# Patient Record
Sex: MALE
[De-identification: names, ages, dates, MRNs, and addresses within clinical notes are randomized per-mention and may not be internally consistent; named-entity substitution may affect disease eponyms.]

---

## 2018-01-13 ENCOUNTER — HOSPITAL ENCOUNTER (EMERGENCY)
Dept: HOSPITAL 92 - ERS | Age: 43
Discharge: HOME | End: 2018-01-13
Payer: SELF-PAY

## 2018-01-13 DIAGNOSIS — F17.210: ICD-10-CM

## 2018-01-13 DIAGNOSIS — J20.9: Primary | ICD-10-CM

## 2018-01-13 DIAGNOSIS — Z71.6: ICD-10-CM

## 2018-01-13 LAB
ALBUMIN SERPL BCG-MCNC: 4.2 G/DL (ref 3.5–5)
ALP SERPL-CCNC: 76 U/L (ref 40–150)
ALT SERPL W P-5'-P-CCNC: 19 U/L (ref 8–55)
ANION GAP SERPL CALC-SCNC: 13 MMOL/L (ref 10–20)
AST SERPL-CCNC: 18 U/L (ref 5–34)
BASOPHILS # BLD AUTO: 0 THOU/UL (ref 0–0.2)
BASOPHILS NFR BLD AUTO: 0.1 % (ref 0–1)
BILIRUB SERPL-MCNC: 0.5 MG/DL (ref 0.2–1.2)
BUN SERPL-MCNC: 10 MG/DL (ref 8.9–20.6)
CALCIUM SERPL-MCNC: 8.8 MG/DL (ref 7.8–10.44)
CHLORIDE SERPL-SCNC: 103 MMOL/L (ref 98–107)
CO2 SERPL-SCNC: 20 MMOL/L (ref 22–29)
CREAT CL PREDICTED SERPL C-G-VRATE: 0 ML/MIN (ref 70–130)
EOSINOPHIL # BLD AUTO: 0 THOU/UL (ref 0–0.7)
EOSINOPHIL NFR BLD AUTO: 0.6 % (ref 0–10)
GLOBULIN SER CALC-MCNC: 2.8 G/DL (ref 2.4–3.5)
GLUCOSE SERPL-MCNC: 108 MG/DL (ref 70–105)
HGB BLD-MCNC: 14.2 G/DL (ref 14–18)
LYMPHOCYTES # BLD: 0.5 THOU/UL (ref 1.2–3.4)
LYMPHOCYTES NFR BLD AUTO: 6.7 % (ref 21–51)
MCH RBC QN AUTO: 31.1 PG (ref 27–31)
MCV RBC AUTO: 92.5 FL (ref 80–94)
MONOCYTES # BLD AUTO: 0.5 THOU/UL (ref 0.11–0.59)
MONOCYTES NFR BLD AUTO: 6.2 % (ref 0–10)
NEUTROPHILS # BLD AUTO: 6.5 THOU/UL (ref 1.4–6.5)
NEUTROPHILS NFR BLD AUTO: 86.4 % (ref 42–75)
PLATELET # BLD AUTO: 150 THOU/UL (ref 130–400)
POTASSIUM SERPL-SCNC: 3.7 MMOL/L (ref 3.5–5.1)
RBC # BLD AUTO: 4.57 MILL/UL (ref 4.7–6.1)
SODIUM SERPL-SCNC: 132 MMOL/L (ref 136–145)
WBC # BLD AUTO: 7.5 THOU/UL (ref 4.8–10.8)

## 2018-01-13 PROCEDURE — 96374 THER/PROPH/DIAG INJ IV PUSH: CPT

## 2018-01-13 PROCEDURE — 83605 ASSAY OF LACTIC ACID: CPT

## 2018-01-13 PROCEDURE — 87804 INFLUENZA ASSAY W/OPTIC: CPT

## 2018-01-13 PROCEDURE — 36415 COLL VENOUS BLD VENIPUNCTURE: CPT

## 2018-01-13 PROCEDURE — 71046 X-RAY EXAM CHEST 2 VIEWS: CPT

## 2018-01-13 PROCEDURE — A4216 STERILE WATER/SALINE, 10 ML: HCPCS

## 2018-01-13 PROCEDURE — 87040 BLOOD CULTURE FOR BACTERIA: CPT

## 2018-01-13 PROCEDURE — 99406 BEHAV CHNG SMOKING 3-10 MIN: CPT

## 2018-01-13 PROCEDURE — 94640 AIRWAY INHALATION TREATMENT: CPT

## 2018-01-13 PROCEDURE — 80053 COMPREHEN METABOLIC PANEL: CPT

## 2018-01-13 PROCEDURE — 85025 COMPLETE CBC W/AUTO DIFF WBC: CPT

## 2018-01-13 NOTE — RAD
CHEST PA AND LATERAL:

 

Date:  01/13/18

 

HISTORY:  

42-year-old male with dyspnea, weakness, headache, and nonproductive cough. 

 

COMPARISON: 

10/05/15. 

 

FINDINGS:

Heart size is normal. The lungs are clear. 

 

IMPRESSION: 

No acute intrathoracic disease. 

 

 

 

POS: [facility]

## 2018-05-20 ENCOUNTER — HOSPITAL ENCOUNTER (EMERGENCY)
Dept: HOSPITAL 92 - ERS | Age: 43
LOS: 1 days | Discharge: HOME | End: 2018-05-21
Payer: SELF-PAY

## 2018-05-20 DIAGNOSIS — R10.9: Primary | ICD-10-CM

## 2018-05-20 DIAGNOSIS — F17.210: ICD-10-CM

## 2018-05-20 LAB
ALBUMIN SERPL BCG-MCNC: 4.2 G/DL (ref 3.5–5)
ALP SERPL-CCNC: 90 U/L (ref 40–150)
ALT SERPL W P-5'-P-CCNC: 14 U/L (ref 8–55)
ANION GAP SERPL CALC-SCNC: 10 MMOL/L (ref 10–20)
AST SERPL-CCNC: 15 U/L (ref 5–34)
BASOPHILS # BLD AUTO: 0.1 THOU/UL (ref 0–0.2)
BASOPHILS NFR BLD AUTO: 0.9 % (ref 0–1)
BILIRUB SERPL-MCNC: 0.4 MG/DL (ref 0.2–1.2)
BUN SERPL-MCNC: 14 MG/DL (ref 8.9–20.6)
CALCIUM SERPL-MCNC: 9 MG/DL (ref 7.8–10.44)
CHLORIDE SERPL-SCNC: 104 MMOL/L (ref 98–107)
CK MB SERPL-MCNC: 0.7 NG/ML (ref 0–6.6)
CK SERPL-CCNC: 135 U/L (ref 30–200)
CO2 SERPL-SCNC: 27 MMOL/L (ref 22–29)
CREAT CL PREDICTED SERPL C-G-VRATE: 0 ML/MIN (ref 70–130)
EOSINOPHIL # BLD AUTO: 0.4 THOU/UL (ref 0–0.7)
EOSINOPHIL NFR BLD AUTO: 4.7 % (ref 0–10)
GLOBULIN SER CALC-MCNC: 2.8 G/DL (ref 2.4–3.5)
GLUCOSE SERPL-MCNC: 89 MG/DL (ref 70–105)
HGB BLD-MCNC: 14.7 G/DL (ref 14–18)
LIPASE SERPL-CCNC: 37 U/L (ref 8–78)
LYMPHOCYTES # BLD: 2.7 THOU/UL (ref 1.2–3.4)
LYMPHOCYTES NFR BLD AUTO: 30.7 % (ref 21–51)
MAGNESIUM SERPL-MCNC: 2.5 MG/DL (ref 1.6–2.6)
MCH RBC QN AUTO: 30.5 PG (ref 27–31)
MCV RBC AUTO: 92.9 FL (ref 80–94)
MONOCYTES # BLD AUTO: 0.5 THOU/UL (ref 0.11–0.59)
MONOCYTES NFR BLD AUTO: 5.3 % (ref 0–10)
NEUTROPHILS # BLD AUTO: 5.1 THOU/UL (ref 1.4–6.5)
NEUTROPHILS NFR BLD AUTO: 58.3 % (ref 42–75)
PLATELET # BLD AUTO: 194 THOU/UL (ref 130–400)
POTASSIUM SERPL-SCNC: 3.7 MMOL/L (ref 3.5–5.1)
RBC # BLD AUTO: 4.82 MILL/UL (ref 4.7–6.1)
SODIUM SERPL-SCNC: 137 MMOL/L (ref 136–145)
SP GR UR STRIP: 1.03 (ref 1–1.04)
TROPONIN I SERPL DL<=0.01 NG/ML-MCNC: (no result) NG/ML (ref ?–0.03)
WBC # BLD AUTO: 8.7 THOU/UL (ref 4.8–10.8)

## 2018-05-20 PROCEDURE — 80053 COMPREHEN METABOLIC PANEL: CPT

## 2018-05-20 PROCEDURE — 85025 COMPLETE CBC W/AUTO DIFF WBC: CPT

## 2018-05-20 PROCEDURE — 83735 ASSAY OF MAGNESIUM: CPT

## 2018-05-20 PROCEDURE — 84484 ASSAY OF TROPONIN QUANT: CPT

## 2018-05-20 PROCEDURE — 74177 CT ABD & PELVIS W/CONTRAST: CPT

## 2018-05-20 PROCEDURE — 71045 X-RAY EXAM CHEST 1 VIEW: CPT

## 2018-05-20 PROCEDURE — 82550 ASSAY OF CK (CPK): CPT

## 2018-05-20 PROCEDURE — 83690 ASSAY OF LIPASE: CPT

## 2018-05-20 PROCEDURE — 81003 URINALYSIS AUTO W/O SCOPE: CPT

## 2018-05-20 PROCEDURE — 82553 CREATINE MB FRACTION: CPT

## 2018-05-20 PROCEDURE — 36415 COLL VENOUS BLD VENIPUNCTURE: CPT

## 2018-05-20 PROCEDURE — 93005 ELECTROCARDIOGRAM TRACING: CPT

## 2018-05-20 NOTE — CT
CONTRAST ENHANCED CT OF THE ABDOMEN AND PELVIS:

 

History: 42-year-old presents with constant stabbing pain for the past several months. The patient pr
esents to Emergency Department for emergent work up of this chronic abdominal pain. IV contrast given
. Oral contrast, unfortunately, as not given which decreases the sensitivity for detection of patholo
gy. 

 

FINDINGS: 

The lung bases are unremarkable. 

 

No evidence of free intraperitoneal air is seen. 

 

The liver and spleen are unremarkable. The gallbladder is unremarkable. The pancreas is unremarkable.
 

 

The stomach is moderately distended. The small bowel demonstrates normal appearance without evidence 
of obstruction. 

 

The appendix is difficult to visualize and is definitely not seen. 

 

No evidence of periaortic lymphadenopathy is seen. 

 

The patient has had previous lower lumbar and upper sacral fusion. 

 

No evidence of periaortic lymphadenopathy seen. No evidence of renal calculi seen. 

 

An area of a small sclerotic change is seen in the left ischium, possibly representing a bone island.
 

 

No other obvious bony lesions seen. 

 

Incidentally noted retroaortic left renal vein is present. 

 

 

IMPRESSION: 

1. Post-surgical changes, otherwise unremarkable CT images of the abdomen and pelvis. 

 

POS: [facility]

## 2018-05-20 NOTE — RAD
AP CHEST:

 

History: Chest pain, abdominal pain. 

 

FINDINGS: 

The lungs are well aerated. No evidence of active intrathoracic disease is seen. No evidence of effus
ions, pneumonia, or pneumothorax is seen. 

 

IMPRESSION: 

Unremarkable AP chest. 

 

POS: [facility]

## 2020-03-19 ENCOUNTER — HOSPITAL ENCOUNTER (EMERGENCY)
Dept: HOSPITAL 57 - BURERS | Age: 45
Discharge: HOME | End: 2020-03-19
Payer: SELF-PAY

## 2020-03-19 DIAGNOSIS — R10.32: ICD-10-CM

## 2020-03-19 DIAGNOSIS — R11.2: ICD-10-CM

## 2020-03-19 DIAGNOSIS — Z87.891: ICD-10-CM

## 2020-03-19 DIAGNOSIS — G89.29: Primary | ICD-10-CM

## 2020-03-19 LAB
ALBUMIN SERPL BCG-MCNC: 4.5 G/DL (ref 3.5–5)
ALP SERPL-CCNC: 102 U/L (ref 40–110)
ALT SERPL W P-5'-P-CCNC: 23 U/L (ref 8–55)
ANION GAP SERPL CALC-SCNC: 15 MMOL/L (ref 10–20)
AST SERPL-CCNC: 22 U/L (ref 5–34)
BASOPHILS # BLD AUTO: 0.1 THOU/UL (ref 0–0.2)
BASOPHILS NFR BLD AUTO: 1.1 % (ref 0–1)
BILIRUB SERPL-MCNC: 0.7 MG/DL (ref 0.2–1.2)
BUN SERPL-MCNC: 12 MG/DL (ref 8.9–20.6)
CALCIUM SERPL-MCNC: 9.1 MG/DL (ref 7.8–10.44)
CHLORIDE SERPL-SCNC: 103 MMOL/L (ref 98–107)
CO2 SERPL-SCNC: 24 MMOL/L (ref 22–29)
CREAT CL PREDICTED SERPL C-G-VRATE: 0 ML/MIN (ref 70–130)
EOSINOPHIL # BLD AUTO: 0.3 THOU/UL (ref 0–0.7)
EOSINOPHIL NFR BLD AUTO: 4.3 % (ref 0–10)
GLOBULIN SER CALC-MCNC: 3.2 G/DL (ref 2.4–3.5)
GLUCOSE SERPL-MCNC: 115 MG/DL (ref 70–105)
HGB BLD-MCNC: 15 G/DL (ref 14–18)
LIPASE SERPL-CCNC: 16 U/L (ref 8–78)
LYMPHOCYTES # BLD AUTO: 2.1 THOU/UL (ref 1.2–3.4)
LYMPHOCYTES NFR BLD AUTO: 26.9 % (ref 21–51)
MCH RBC QN AUTO: 28.9 PG (ref 27–31)
MCV RBC AUTO: 90.7 FL (ref 78–98)
MONOCYTES # BLD AUTO: 0.6 THOU/UL (ref 0.11–0.59)
MONOCYTES NFR BLD AUTO: 7.6 % (ref 0–10)
NEUTROPHILS # BLD AUTO: 4.7 THOU/UL (ref 1.4–6.5)
NEUTROPHILS NFR BLD AUTO: 60.2 % (ref 42–75)
PLATELET # BLD AUTO: 217 THOU/UL (ref 130–400)
POTASSIUM SERPL-SCNC: 4 MMOL/L (ref 3.5–5.1)
RBC # BLD AUTO: 5.2 MILL/UL (ref 4.7–6.1)
SODIUM SERPL-SCNC: 138 MMOL/L (ref 136–145)
WBC # BLD AUTO: 7.8 THOU/UL (ref 4.8–10.8)

## 2020-03-19 PROCEDURE — 83690 ASSAY OF LIPASE: CPT

## 2020-03-19 PROCEDURE — 84484 ASSAY OF TROPONIN QUANT: CPT

## 2020-03-19 PROCEDURE — 36415 COLL VENOUS BLD VENIPUNCTURE: CPT

## 2020-03-19 PROCEDURE — 80053 COMPREHEN METABOLIC PANEL: CPT

## 2020-03-19 PROCEDURE — 93005 ELECTROCARDIOGRAM TRACING: CPT

## 2020-03-19 PROCEDURE — 85025 COMPLETE CBC W/AUTO DIFF WBC: CPT

## 2020-09-21 ENCOUNTER — HOSPITAL ENCOUNTER (OUTPATIENT)
Dept: HOSPITAL 92 - ERS | Age: 45
Setting detail: OBSERVATION
LOS: 1 days | Discharge: HOME | End: 2020-09-22
Attending: INTERNAL MEDICINE | Admitting: INTERNAL MEDICINE
Payer: COMMERCIAL

## 2020-09-21 VITALS — BODY MASS INDEX: 25.7 KG/M2

## 2020-09-21 DIAGNOSIS — J12.89: ICD-10-CM

## 2020-09-21 DIAGNOSIS — F17.210: ICD-10-CM

## 2020-09-21 DIAGNOSIS — Z79.02: ICD-10-CM

## 2020-09-21 DIAGNOSIS — Z79.899: ICD-10-CM

## 2020-09-21 DIAGNOSIS — R07.89: Primary | ICD-10-CM

## 2020-09-21 DIAGNOSIS — U07.1: ICD-10-CM

## 2020-09-21 DIAGNOSIS — K21.9: ICD-10-CM

## 2020-09-21 DIAGNOSIS — Z79.82: ICD-10-CM

## 2020-09-21 DIAGNOSIS — Z95.5: ICD-10-CM

## 2020-09-21 LAB
ALBUMIN SERPL BCG-MCNC: 3.8 G/DL (ref 3.5–5)
ALP SERPL-CCNC: 92 U/L (ref 40–110)
ALT SERPL W P-5'-P-CCNC: 31 U/L (ref 8–55)
ANION GAP SERPL CALC-SCNC: 12 MMOL/L (ref 10–20)
AST SERPL-CCNC: 27 U/L (ref 5–34)
BILIRUB SERPL-MCNC: 0.3 MG/DL (ref 0.2–1.2)
BUN SERPL-MCNC: 18 MG/DL (ref 8.9–20.6)
CALCIUM SERPL-MCNC: 8.3 MG/DL (ref 7.8–10.44)
CHLORIDE SERPL-SCNC: 106 MMOL/L (ref 98–107)
CO2 SERPL-SCNC: 20 MMOL/L (ref 22–29)
CREAT CL PREDICTED SERPL C-G-VRATE: 0 ML/MIN (ref 70–130)
GLOBULIN SER CALC-MCNC: 2.5 G/DL (ref 2.4–3.5)
GLUCOSE SERPL-MCNC: 90 MG/DL (ref 70–105)
HGB BLD-MCNC: 12.7 G/DL (ref 14–18)
MCH RBC QN AUTO: 30.9 PG (ref 27–31)
MCV RBC AUTO: 93.5 FL (ref 78–98)
MDIFF COMPLETE?: YES
PLATELET # BLD AUTO: 176 THOU/UL (ref 130–400)
POTASSIUM SERPL-SCNC: 4.1 MMOL/L (ref 3.5–5.1)
RBC # BLD AUTO: 4.11 MILL/UL (ref 4.7–6.1)
SARS-COV-2 RNA RESP QL NAA+PROBE: DETECTED
SODIUM SERPL-SCNC: 134 MMOL/L (ref 136–145)
TROPONIN I SERPL DL<=0.01 NG/ML-MCNC: 0.01 NG/ML (ref ?–0.03)
WBC # BLD AUTO: 4.8 THOU/UL (ref 4.8–10.8)

## 2020-09-21 PROCEDURE — 87635 SARS-COV-2 COVID-19 AMP PRB: CPT

## 2020-09-21 PROCEDURE — 93005 ELECTROCARDIOGRAM TRACING: CPT

## 2020-09-21 PROCEDURE — 80053 COMPREHEN METABOLIC PANEL: CPT

## 2020-09-21 PROCEDURE — U0003 INFECTIOUS AGENT DETECTION BY NUCLEIC ACID (DNA OR RNA); SEVERE ACUTE RESPIRATORY SYNDROME CORONAVIRUS 2 (SARS-COV-2) (CORONAVIRUS DISEASE [COVID-19]), AMPLIFIED PROBE TECHNIQUE, MAKING USE OF HIGH THROUGHPUT TECHNOLOGIES AS DESCRIBED BY CMS-2020-01-R: HCPCS

## 2020-09-21 PROCEDURE — 84484 ASSAY OF TROPONIN QUANT: CPT

## 2020-09-21 PROCEDURE — 93010 ELECTROCARDIOGRAM REPORT: CPT

## 2020-09-21 PROCEDURE — 96374 THER/PROPH/DIAG INJ IV PUSH: CPT

## 2020-09-21 PROCEDURE — 96376 TX/PRO/DX INJ SAME DRUG ADON: CPT

## 2020-09-21 PROCEDURE — G0378 HOSPITAL OBSERVATION PER HR: HCPCS

## 2020-09-21 PROCEDURE — 83880 ASSAY OF NATRIURETIC PEPTIDE: CPT

## 2020-09-21 PROCEDURE — 85025 COMPLETE CBC W/AUTO DIFF WBC: CPT

## 2020-09-21 PROCEDURE — 36415 COLL VENOUS BLD VENIPUNCTURE: CPT

## 2020-09-21 PROCEDURE — 71045 X-RAY EXAM CHEST 1 VIEW: CPT

## 2020-09-21 PROCEDURE — 96375 TX/PRO/DX INJ NEW DRUG ADDON: CPT

## 2020-09-21 PROCEDURE — 76936 ECHO GUIDE FOR ARTERY REPAIR: CPT

## 2020-09-21 RX ADMIN — Medication SCH ML: at 21:04

## 2020-09-21 RX ADMIN — TICAGRELOR SCH: 90 TABLET ORAL at 07:59

## 2020-09-21 RX ADMIN — TICAGRELOR SCH MG: 90 TABLET ORAL at 09:42

## 2020-09-21 RX ADMIN — Medication SCH ML: at 08:17

## 2020-09-21 RX ADMIN — TICAGRELOR SCH MG: 90 TABLET ORAL at 21:02

## 2020-09-21 RX ADMIN — ASPIRIN SCH: 81 TABLET ORAL at 07:58

## 2020-09-21 NOTE — RAD
Exam: Chest one view



HISTORY:Left-sided chest pain.



Comparison: 9/11/2020



FINDINGS:

Cardiac silhouette: Normal

Aorta: Unremarkable

Pulmonary vessels: Normal

Costophrenic angles: Clear



LUNGS: No masses or consolidation.



Pneumothorax: None



Osseous abnormalities: None



IMPRESSION: No acute cardiopulmonary process.



Reported By: Rupinder Garcia 

Electronically Signed:  9/21/2020 7:20 AM

## 2020-09-21 NOTE — ULT
US PseudoAneurysm Cody Evl



History: Evaluate for pseudoaneurysm. Recent catheterization.



Comparison: None.



Findings: Real-time grayscale color and spectral analysis of the right groin was performed.



No pseudoaneurysm is appreciated. Mild soft tissue swelling.



Impression: No pseudoaneurysm.



Reported By: Alex Last 

Electronically Signed:  9/21/2020 11:29 AM

## 2020-09-21 NOTE — PRG
DATE OF SERVICE:  09/21/2020



SUBJECTIVE:  Mr. Neff came back to the emergency room.  He has sharp stabbing

chest pain.  He can localize with one finger.  It was reproduced with palpation of

his ribs, but did not get worse with a breath. 



He came back to the emergency room.  Cardiac enzymes were negative.  EKG. 

Normal.



OBJECTIVE:  VITAL SIGNS:  Blood pressure 105/60. 

LUNGS:  Clear. 

CARDIAC:  Normal S1, normal S2.   

ABDOMEN:  Soft, nontender EXTREMITIES:  No edema.  In the right groin, there is a

small __________ at the cath site. 



ASSESSMENT:  

1. Chest pain, sounds more musculoskeletal.

2. Recent myocardial infarction.



PLAN:  Give him a dose of Toradol. Repeat echocardiogram. 

We will do ultrasound of the groin since he is here, reevaluate and need to get the

patient's pain under control prior to discharge, otherwise, I think he will come

back to the hospital.  I did review the cath films, stented artery had an excellent

result __________ stent across a very small branch of the LAD.  It looks like about

a 60% narrowing, but the lesion is just after an extremely large septal perforating

artery.  This supplies most of his septum, it is suboptimal for stenting.  The

patient's pain at this time sounds noncardiac.  We will continue to follow with you. 







Job ID:  [id number]

## 2020-09-21 NOTE — HP
REASON FOR ADMISSION:  Chest pain.



HISTORY OF PRESENT ILLNESS:  This is a 45-year-old male patient who presented to the

ER complaining of chest pain described as stabbing like in nature, localized in the

left parasternal area off and on, occurring at rest, but influenced by positioning

himself on the left side, which increases his pain.  He denies shortness of breath.

The intensity of the pain can range from mild to severe and it reminds him of his

the pain that he had before he got a stent placed 10 days ago. 



Reviewing his records, the patient was admitted to our hospital approximately 10

days ago, diagnosed with non-ST elevation MI.  He underwent cardiac catheterization

by Cardiology, which showed 95% obstruction of circumflex and 60% of the LAD.  He

underwent stent placement to the circumflex and was started on aspirin, Brilinta, an

ACE inhibitor, and statin.  The patient was discharged home and he has been

compliant with his medications. 



In the ER, he did complain of left lower quadrant pain and generalized abdominal

pain that has been ongoing, I did review his records and I see that he had an EGD

done and he was positive for H pylori.  It was documented that he is being treated

for that. 



PAST MEDICAL HISTORY:  

1. Non-ST elevation MI.

2. GERD.



SOCIAL HISTORY:  He is an active smoker.  Does not drink alcohol.  Does not abuse

drugs. 



ALLERGIES:  NOT KNOWN TO HAVE ANY DRUG ALLERGIES.



FAMILY HISTORY:  Brother had heart attack at age 47, his father at age 65.



REVIEW OF SYSTEMS:  All systems reviewed except the above mentioned, found to be

negative. 



PHYSICAL EXAMINATION:

GENERAL:  Awake, alert, oriented, does not appear in distress. 

VITAL SIGNS:  His blood pressure is 100/51, pulse 67, temperature is 97.8, and

saturating 90% on room air. 

HEENT:  Head is nontraumatic and normocephalic.  Pupils are equal and reactive.

Extraocular movements are intact.  Nonicteric sclerae.  Well injected conjunctivae.

Oral mucosa normal.  Nasal mucosa normal. 

NECK:  Supple.  No adenopathy.  No murmur.  Thyroid is not palpable.  Trachea is

midline.  No supraclavicular adenopathy. 

CARDIOVASCULAR:  S1, S2 regular.  No murmur.  No gallops.  No friction rubs.  No

displacement of PMI.  I am able to reproduce the pain that he is complaining of with

palpation of his left parasternal area. 

ABDOMEN:  Bowel sounds are positive.  Abdomen is slightly distended.  It is tender

to touch, but overall soft. 

EXTREMITIES:  No lower extremity edema.  No cyanosis. 

NEUROLOGIC:  Cranial nerves 2 through 12 within normal limits.  Normal motor

function.  Normal sensory function.  Normal reflexes. 



LABORATORY DATA:  Blood work shows a WBC of 4.8, hemoglobin 12.7, platelets 176.

Sodium 134, potassium 4.1, bicarb of 20, creatinine 1.06.  Troponin 0.01, repeat

0.013.  EKG shows normal sinus rhythm, no ST-segment or T-wave changes. 



ASSESSMENT AND PLAN:  This is a 45-year-old male patient presenting with chest pain.

 He had a stent placed approximately 10 days ago.  So far, his troponins are

negative.  His pain is somewhat reproducible with palpation.  The patient will be

admitted to telemetry.  We will continue cycling his cardiac enzymes.  I will

consult Cardiology and resume his home medications when the med [facility] is available. 



In regard of his abdominal pain, it seems like it has been a chronic issue.  He is

on the H pylori treatment regimen and Protonix, we will resume those medications. 



For deep venous thrombosis prophylaxis, he will be on SCDs.







Job ID:  [id number]

## 2020-09-22 VITALS — TEMPERATURE: 98.9 F | DIASTOLIC BLOOD PRESSURE: 68 MMHG | SYSTOLIC BLOOD PRESSURE: 120 MMHG

## 2020-09-22 RX ADMIN — TICAGRELOR SCH MG: 90 TABLET ORAL at 09:51

## 2020-09-22 RX ADMIN — ASPIRIN SCH MG: 81 TABLET ORAL at 09:50

## 2020-09-22 RX ADMIN — Medication SCH ML: at 09:51

## 2020-09-23 NOTE — DIS
DATE OF ADMISSION:  09/21/2020



DATE OF DISCHARGE:  09/22/2020



DISCHARGE DIAGNOSES:  

1. Atypical chest pain.

2. COVID pneumonia.



HOSPITAL COURSE:  The patient is a 45-year-old male who initially presented to the

hospital on 09/21, with complaints of chest pain.  The patient had just got stent

about 10 days ago.  He was seen by Cardiology who thought that this pain was most

likely secondary to a noncardiac related.  He did have an ultrasound of his groin

area to rule out pseudoaneurysm.  There was no pseudoaneurysm that was noted.  The

patient continued to have negative troponins.  He, however, was tested positive for

COVID.  He was completely asymptomatic.  He was discharged home.  He will follow up

with his primary.  His home medications were resumed.  He is going to be on: 

1. Brilinta 90 mg b.i.d.

2. Aspirin 81 mg daily.

3. Atorvastatin 40 mg at bedtime.

4. Lisinopril 5 mg daily.

5. Omeprazole 40 mg daily.



DISCHARGE PHYSICAL EXAMINATION:  VITAL SIGNS:  Temperature of 98.9, pulse 74, O2 sat

97% on room air, blood pressure __________. 

GENERAL:  He is awake, alert, and oriented x3.  Does not appear in distress. 

CV:  S1, S2 present.  No murmurs, rubs, gallops. 



Again, he will be discharged home.  He was asked to come back if any of his symptoms

changed. 







Job ID:  [id number]

## 2020-09-27 NOTE — EKG
Test Reason : C/O CHEST PAIN

Blood Pressure : ***/*** mmHG

Vent. Rate : 058 BPM     Atrial Rate : 058 BPM

   P-R Int : 176 ms          QRS Dur : 092 ms

    QT Int : 398 ms       P-R-T Axes : 052 042 036 degrees

   QTc Int : 390 ms

 

Sinus bradycardia

Otherwise normal ECG

When compared with ECG of 21-SEP-2020 00:48, (Unconfirmed)

No significant change was found

Confirmed by JULIA ROBERTS, JANE ([id number]) on 9/27/2020 10:41:23 AM

 

Referred By:  WHITNEY           Confirmed By:JANE DUMONT MD

## 2020-09-30 ENCOUNTER — HOSPITAL ENCOUNTER (EMERGENCY)
Dept: HOSPITAL 92 - ERS | Age: 45
Discharge: HOME | End: 2020-09-30
Payer: SELF-PAY

## 2020-09-30 DIAGNOSIS — Z79.899: ICD-10-CM

## 2020-09-30 DIAGNOSIS — I25.2: ICD-10-CM

## 2020-09-30 DIAGNOSIS — Z87.891: ICD-10-CM

## 2020-09-30 DIAGNOSIS — R07.89: Primary | ICD-10-CM

## 2020-09-30 DIAGNOSIS — Z79.82: ICD-10-CM

## 2020-09-30 LAB
ALBUMIN SERPL BCG-MCNC: 4.2 G/DL (ref 3.5–5)
ALP SERPL-CCNC: 99 U/L (ref 40–110)
ALT SERPL W P-5'-P-CCNC: 53 U/L (ref 8–55)
ANION GAP SERPL CALC-SCNC: 12 MMOL/L (ref 10–20)
AST SERPL-CCNC: 27 U/L (ref 5–34)
BASOPHILS # BLD AUTO: 0.1 THOU/UL (ref 0–0.2)
BASOPHILS NFR BLD AUTO: 1 % (ref 0–1)
BILIRUB SERPL-MCNC: 0.4 MG/DL (ref 0.2–1.2)
BUN SERPL-MCNC: 12 MG/DL (ref 8.9–20.6)
CALCIUM SERPL-MCNC: 8.8 MG/DL (ref 7.8–10.44)
CHLORIDE SERPL-SCNC: 106 MMOL/L (ref 98–107)
CO2 SERPL-SCNC: 26 MMOL/L (ref 22–29)
CREAT CL PREDICTED SERPL C-G-VRATE: 0 ML/MIN (ref 70–130)
EOSINOPHIL # BLD AUTO: 0.6 THOU/UL (ref 0–0.7)
EOSINOPHIL NFR BLD AUTO: 7.4 % (ref 0–10)
GLOBULIN SER CALC-MCNC: 2.8 G/DL (ref 2.4–3.5)
GLUCOSE SERPL-MCNC: 95 MG/DL (ref 70–105)
HGB BLD-MCNC: 13.7 G/DL (ref 14–18)
LYMPHOCYTES # BLD: 2 THOU/UL (ref 1.2–3.4)
LYMPHOCYTES NFR BLD AUTO: 24.4 % (ref 21–51)
MCH RBC QN AUTO: 30.2 PG (ref 27–31)
MCV RBC AUTO: 92.1 FL (ref 78–98)
MONOCYTES # BLD AUTO: 0.7 THOU/UL (ref 0.11–0.59)
MONOCYTES NFR BLD AUTO: 8.4 % (ref 0–10)
NEUTROPHILS # BLD AUTO: 4.9 THOU/UL (ref 1.4–6.5)
NEUTROPHILS NFR BLD AUTO: 58.7 % (ref 42–75)
PLATELET # BLD AUTO: 209 THOU/UL (ref 130–400)
POTASSIUM SERPL-SCNC: 3.9 MMOL/L (ref 3.5–5.1)
RBC # BLD AUTO: 4.53 MILL/UL (ref 4.7–6.1)
SODIUM SERPL-SCNC: 140 MMOL/L (ref 136–145)
WBC # BLD AUTO: 8.3 THOU/UL (ref 4.8–10.8)

## 2020-09-30 PROCEDURE — 93005 ELECTROCARDIOGRAM TRACING: CPT

## 2020-09-30 PROCEDURE — 71045 X-RAY EXAM CHEST 1 VIEW: CPT

## 2020-09-30 PROCEDURE — 36415 COLL VENOUS BLD VENIPUNCTURE: CPT

## 2020-09-30 PROCEDURE — 85025 COMPLETE CBC W/AUTO DIFF WBC: CPT

## 2020-09-30 PROCEDURE — 84484 ASSAY OF TROPONIN QUANT: CPT

## 2020-09-30 PROCEDURE — 80053 COMPREHEN METABOLIC PANEL: CPT

## 2020-09-30 NOTE — RAD
XR Chest 1 View Portable



HISTORY: Chest pain



COMPARISON: 9/21/2020



FINDINGS: The heart size is normal. The lungs are well expanded without focal areas of consolidation,
 pneumothorax or pleural effusions.



IMPRESSION: No radiographic evidence of acute cardiopulmonary process.



Reported By: José Miguel Prakash 

Electronically Signed:  9/30/2020 9:55 PM

## 2020-10-05 ENCOUNTER — HOSPITAL ENCOUNTER (EMERGENCY)
Dept: HOSPITAL 92 - ERS | Age: 45
LOS: 1 days | Discharge: HOME | End: 2020-10-06
Payer: SELF-PAY

## 2020-10-05 DIAGNOSIS — J18.9: Primary | ICD-10-CM

## 2020-10-05 DIAGNOSIS — Z87.891: ICD-10-CM

## 2020-10-05 DIAGNOSIS — Z79.899: ICD-10-CM

## 2020-10-05 DIAGNOSIS — Z79.82: ICD-10-CM

## 2020-10-05 LAB
BASOPHILS # BLD AUTO: 0.1 THOU/UL (ref 0–0.2)
BASOPHILS NFR BLD AUTO: 1.1 % (ref 0–1)
EOSINOPHIL # BLD AUTO: 0.5 THOU/UL (ref 0–0.7)
EOSINOPHIL NFR BLD AUTO: 7.5 % (ref 0–10)
HGB BLD-MCNC: 12.9 G/DL (ref 14–18)
LYMPHOCYTES # BLD: 1.9 THOU/UL (ref 1.2–3.4)
LYMPHOCYTES NFR BLD AUTO: 29 % (ref 21–51)
MCH RBC QN AUTO: 30.2 PG (ref 27–31)
MCV RBC AUTO: 92.2 FL (ref 78–98)
MONOCYTES # BLD AUTO: 0.6 THOU/UL (ref 0.11–0.59)
MONOCYTES NFR BLD AUTO: 8.8 % (ref 0–10)
NEUTROPHILS # BLD AUTO: 3.5 THOU/UL (ref 1.4–6.5)
NEUTROPHILS NFR BLD AUTO: 53.6 % (ref 42–75)
PLATELET # BLD AUTO: 201 THOU/UL (ref 130–400)
RBC # BLD AUTO: 4.26 MILL/UL (ref 4.7–6.1)
WBC # BLD AUTO: 6.6 THOU/UL (ref 4.8–10.8)

## 2020-10-05 PROCEDURE — 84484 ASSAY OF TROPONIN QUANT: CPT

## 2020-10-05 PROCEDURE — 71045 X-RAY EXAM CHEST 1 VIEW: CPT

## 2020-10-05 PROCEDURE — 83880 ASSAY OF NATRIURETIC PEPTIDE: CPT

## 2020-10-05 PROCEDURE — 80306 DRUG TEST PRSMV INSTRMNT: CPT

## 2020-10-05 PROCEDURE — 85025 COMPLETE CBC W/AUTO DIFF WBC: CPT

## 2020-10-05 PROCEDURE — 93005 ELECTROCARDIOGRAM TRACING: CPT

## 2020-10-05 PROCEDURE — 36415 COLL VENOUS BLD VENIPUNCTURE: CPT

## 2020-10-05 PROCEDURE — 80053 COMPREHEN METABOLIC PANEL: CPT

## 2020-10-05 NOTE — RAD
Exam: Chest one view



HISTORY:Chest pain



Comparison: 9/30/2020



FINDINGS:

Cardiac silhouette: Normal

Aorta: Unremarkable

Pulmonary vessels: Normal

Costophrenic angles: Clear



LUNGS: Patchy interstitial opacities in the lung bases with questionable focal alveolar infiltrate in
 the right lower lobe.

Pneumothorax: None



Osseous abnormalities: None



IMPRESSION: Bibasilar interstitial infiltrate with possible superimposed alveolar infiltrate in the r
ight lower lobe.



Reported By: Rupinder Garcia 

Electronically Signed:  10/5/2020 11:41 PM

## 2020-10-06 ENCOUNTER — HOSPITAL ENCOUNTER (EMERGENCY)
Dept: HOSPITAL 57 - BURERS | Age: 45
LOS: 1 days | Discharge: HOME | End: 2020-10-07
Payer: SELF-PAY

## 2020-10-06 DIAGNOSIS — F17.210: ICD-10-CM

## 2020-10-06 DIAGNOSIS — I10: ICD-10-CM

## 2020-10-06 DIAGNOSIS — Z79.899: ICD-10-CM

## 2020-10-06 DIAGNOSIS — I25.2: ICD-10-CM

## 2020-10-06 DIAGNOSIS — R07.89: Primary | ICD-10-CM

## 2020-10-06 DIAGNOSIS — E78.00: ICD-10-CM

## 2020-10-06 DIAGNOSIS — Z79.82: ICD-10-CM

## 2020-10-06 LAB
ALBUMIN SERPL BCG-MCNC: 3.7 G/DL (ref 3.5–5)
ALBUMIN SERPL BCG-MCNC: 3.8 G/DL (ref 3.5–5)
ALP SERPL-CCNC: 83 U/L (ref 40–110)
ALP SERPL-CCNC: 90 U/L (ref 40–110)
ALT SERPL W P-5'-P-CCNC: 28 U/L (ref 8–55)
ALT SERPL W P-5'-P-CCNC: 34 U/L (ref 8–55)
ANION GAP SERPL CALC-SCNC: 12 MMOL/L (ref 10–20)
ANION GAP SERPL CALC-SCNC: 14 MMOL/L (ref 10–20)
AST SERPL-CCNC: 20 U/L (ref 5–34)
AST SERPL-CCNC: 24 U/L (ref 5–34)
BASOPHILS # BLD AUTO: 0.1 THOU/UL (ref 0–0.2)
BASOPHILS NFR BLD AUTO: 1.6 % (ref 0–1)
BILIRUB SERPL-MCNC: 0.5 MG/DL (ref 0.2–1.2)
BILIRUB SERPL-MCNC: 0.6 MG/DL (ref 0.2–1.2)
BUN SERPL-MCNC: 19 MG/DL (ref 8.9–20.6)
BUN SERPL-MCNC: 19 MG/DL (ref 8.9–20.6)
CALCIUM SERPL-MCNC: 8.4 MG/DL (ref 7.8–10.44)
CALCIUM SERPL-MCNC: 8.5 MG/DL (ref 7.8–10.44)
CHLORIDE SERPL-SCNC: 106 MMOL/L (ref 98–107)
CHLORIDE SERPL-SCNC: 106 MMOL/L (ref 98–107)
CO2 SERPL-SCNC: 21 MMOL/L (ref 22–29)
CO2 SERPL-SCNC: 26 MMOL/L (ref 22–29)
CREAT CL PREDICTED SERPL C-G-VRATE: 0 ML/MIN (ref 70–130)
CREAT CL PREDICTED SERPL C-G-VRATE: 0 ML/MIN (ref 70–130)
DRUG SCREEN CUTOFF: (no result)
EOSINOPHIL # BLD AUTO: 0.5 THOU/UL (ref 0–0.7)
EOSINOPHIL NFR BLD AUTO: 8.2 % (ref 0–10)
GLOBULIN SER CALC-MCNC: 2.6 G/DL (ref 2.4–3.5)
GLOBULIN SER CALC-MCNC: 2.8 G/DL (ref 2.4–3.5)
GLUCOSE SERPL-MCNC: 85 MG/DL (ref 70–105)
GLUCOSE SERPL-MCNC: 94 MG/DL (ref 70–105)
HGB BLD-MCNC: 12.6 G/DL (ref 14–18)
LYMPHOCYTES # BLD AUTO: 1.7 THOU/UL (ref 1.2–3.4)
LYMPHOCYTES NFR BLD AUTO: 26 % (ref 21–51)
MCH RBC QN AUTO: 29.2 PG (ref 27–31)
MCV RBC AUTO: 93.6 FL (ref 78–98)
MEDTOX CONTROL LINE VALID?: (no result)
MEDTOX READER #: (no result)
MONOCYTES # BLD AUTO: 0.6 THOU/UL (ref 0.11–0.59)
MONOCYTES NFR BLD AUTO: 8.9 % (ref 0–10)
NEUTROPHILS # BLD AUTO: 3.7 THOU/UL (ref 1.4–6.5)
NEUTROPHILS NFR BLD AUTO: 55.3 % (ref 42–75)
PLATELET # BLD AUTO: 172 THOU/UL (ref 130–400)
POTASSIUM SERPL-SCNC: 3.8 MMOL/L (ref 3.5–5.1)
POTASSIUM SERPL-SCNC: 3.9 MMOL/L (ref 3.5–5.1)
RBC # BLD AUTO: 4.33 MILL/UL (ref 4.7–6.1)
SODIUM SERPL-SCNC: 137 MMOL/L (ref 136–145)
SODIUM SERPL-SCNC: 140 MMOL/L (ref 136–145)
TROPONIN I SERPL DL<=0.01 NG/ML-MCNC: (no result) NG/ML (ref ?–0.03)
WBC # BLD AUTO: 6.6 THOU/UL (ref 4.8–10.8)

## 2020-10-06 PROCEDURE — 85025 COMPLETE CBC W/AUTO DIFF WBC: CPT

## 2020-10-06 PROCEDURE — 36415 COLL VENOUS BLD VENIPUNCTURE: CPT

## 2020-10-06 PROCEDURE — 71045 X-RAY EXAM CHEST 1 VIEW: CPT

## 2020-10-06 PROCEDURE — 93005 ELECTROCARDIOGRAM TRACING: CPT

## 2020-10-06 PROCEDURE — 80053 COMPREHEN METABOLIC PANEL: CPT

## 2020-10-07 LAB — TROPONIN I SERPL DL<=0.01 NG/ML-MCNC: (no result) NG/ML (ref ?–0.03)

## 2020-10-07 NOTE — RAD
PORTABLE CHEST:

 

Date:  10/06/2020

 

An AP portable film at 2222 hours is compared with a 10/05/2020 study done at [facility]. 

 

The heart is normal in size. The patchy area in the right lower lobe seen yesterday is less evident t
sanna. The lungs are basically clear with only the most minor prominence of a few basilar markings. Th
ere are no lobar infiltrates or effusions. 

 

IMPRESSION: 

Improved since yesterday. 

 

 

POS: HOME

## 2020-10-22 ENCOUNTER — HOSPITAL ENCOUNTER (OUTPATIENT)
Dept: HOSPITAL 92 - ERS | Age: 45
Setting detail: OBSERVATION
LOS: 1 days | Discharge: HOME | End: 2020-10-23
Attending: INTERNAL MEDICINE | Admitting: INTERNAL MEDICINE
Payer: SELF-PAY

## 2020-10-22 VITALS — BODY MASS INDEX: 25.7 KG/M2

## 2020-10-22 DIAGNOSIS — Z86.19: ICD-10-CM

## 2020-10-22 DIAGNOSIS — Z95.5: ICD-10-CM

## 2020-10-22 DIAGNOSIS — I25.2: ICD-10-CM

## 2020-10-22 DIAGNOSIS — K21.9: ICD-10-CM

## 2020-10-22 DIAGNOSIS — Z87.891: ICD-10-CM

## 2020-10-22 DIAGNOSIS — R07.89: Primary | ICD-10-CM

## 2020-10-22 DIAGNOSIS — Z79.899: ICD-10-CM

## 2020-10-22 DIAGNOSIS — I25.10: ICD-10-CM

## 2020-10-22 DIAGNOSIS — E78.5: ICD-10-CM

## 2020-10-22 LAB
ALBUMIN SERPL BCG-MCNC: 4 G/DL (ref 3.5–5)
ALP SERPL-CCNC: 114 U/L (ref 40–110)
ALT SERPL W P-5'-P-CCNC: 28 U/L (ref 8–55)
ANION GAP SERPL CALC-SCNC: 13 MMOL/L (ref 10–20)
AST SERPL-CCNC: 28 U/L (ref 5–34)
BASOPHILS # BLD AUTO: 0.1 THOU/UL (ref 0–0.2)
BASOPHILS NFR BLD AUTO: 1.2 % (ref 0–1)
BILIRUB SERPL-MCNC: 0.5 MG/DL (ref 0.2–1.2)
BUN SERPL-MCNC: 14 MG/DL (ref 8.9–20.6)
CALCIUM SERPL-MCNC: 8.6 MG/DL (ref 7.8–10.44)
CHLORIDE SERPL-SCNC: 105 MMOL/L (ref 98–107)
CK SERPL-CCNC: 178 U/L (ref 30–200)
CO2 SERPL-SCNC: 25 MMOL/L (ref 22–29)
CREAT CL PREDICTED SERPL C-G-VRATE: 0 ML/MIN (ref 70–130)
EOSINOPHIL # BLD AUTO: 0.6 THOU/UL (ref 0–0.7)
EOSINOPHIL NFR BLD AUTO: 8.6 % (ref 0–10)
GLOBULIN SER CALC-MCNC: 4 G/DL (ref 2.4–3.5)
GLUCOSE SERPL-MCNC: 96 MG/DL (ref 70–105)
HGB BLD-MCNC: 14.7 G/DL (ref 14–18)
LIPASE SERPL-CCNC: 42 U/L (ref 8–78)
LYMPHOCYTES # BLD: 2.3 THOU/UL (ref 1.2–3.4)
LYMPHOCYTES NFR BLD AUTO: 32.8 % (ref 21–51)
MCH RBC QN AUTO: 29.7 PG (ref 27–31)
MCV RBC AUTO: 91 FL (ref 78–98)
MONOCYTES # BLD AUTO: 0.6 THOU/UL (ref 0.11–0.59)
MONOCYTES NFR BLD AUTO: 8.7 % (ref 0–10)
NEUTROPHILS # BLD AUTO: 3.4 THOU/UL (ref 1.4–6.5)
NEUTROPHILS NFR BLD AUTO: 48.7 % (ref 42–75)
PLATELET # BLD AUTO: 197 THOU/UL (ref 130–400)
POTASSIUM SERPL-SCNC: 4.5 MMOL/L (ref 3.5–5.1)
RBC # BLD AUTO: 4.95 MILL/UL (ref 4.7–6.1)
SODIUM SERPL-SCNC: 138 MMOL/L (ref 136–145)
TROPONIN I SERPL DL<=0.01 NG/ML-MCNC: (no result) NG/ML (ref ?–0.03)
TROPONIN I SERPL DL<=0.01 NG/ML-MCNC: (no result) NG/ML (ref ?–0.03)
WBC # BLD AUTO: 7 THOU/UL (ref 4.8–10.8)

## 2020-10-22 PROCEDURE — 94760 N-INVAS EAR/PLS OXIMETRY 1: CPT

## 2020-10-22 PROCEDURE — 36415 COLL VENOUS BLD VENIPUNCTURE: CPT

## 2020-10-22 PROCEDURE — 84443 ASSAY THYROID STIM HORMONE: CPT

## 2020-10-22 PROCEDURE — 83690 ASSAY OF LIPASE: CPT

## 2020-10-22 PROCEDURE — 82550 ASSAY OF CK (CPK): CPT

## 2020-10-22 PROCEDURE — 71045 X-RAY EXAM CHEST 1 VIEW: CPT

## 2020-10-22 PROCEDURE — 80061 LIPID PANEL: CPT

## 2020-10-22 PROCEDURE — 84484 ASSAY OF TROPONIN QUANT: CPT

## 2020-10-22 PROCEDURE — 81001 URINALYSIS AUTO W/SCOPE: CPT

## 2020-10-22 PROCEDURE — 83735 ASSAY OF MAGNESIUM: CPT

## 2020-10-22 PROCEDURE — A9500 TC99M SESTAMIBI: HCPCS

## 2020-10-22 PROCEDURE — 85025 COMPLETE CBC W/AUTO DIFF WBC: CPT

## 2020-10-22 PROCEDURE — 80053 COMPREHEN METABOLIC PANEL: CPT

## 2020-10-22 PROCEDURE — 96374 THER/PROPH/DIAG INJ IV PUSH: CPT

## 2020-10-22 PROCEDURE — G0378 HOSPITAL OBSERVATION PER HR: HCPCS

## 2020-10-22 PROCEDURE — 78452 HT MUSCLE IMAGE SPECT MULT: CPT

## 2020-10-22 PROCEDURE — 93005 ELECTROCARDIOGRAM TRACING: CPT

## 2020-10-22 PROCEDURE — 83880 ASSAY OF NATRIURETIC PEPTIDE: CPT

## 2020-10-22 PROCEDURE — 93017 CV STRESS TEST TRACING ONLY: CPT

## 2020-10-22 PROCEDURE — 96375 TX/PRO/DX INJ NEW DRUG ADDON: CPT

## 2020-10-22 PROCEDURE — 80048 BASIC METABOLIC PNL TOTAL CA: CPT

## 2020-10-22 RX ADMIN — NITROGLYCERIN SCH: 20 OINTMENT TOPICAL at 22:50

## 2020-10-22 NOTE — PDOC.HHP
Hospitalist HPI





- History of Present Illness


Chest pain


History of Present Illness: 





PCP: Dr. Bahena





Patient is a 45-year-old male with a history of MI, recent stent placement 

(), hypertension, hyperlipidemia, smoker, and GERD that presents to the 

emergency department for the above complaint.  The patient reports gradual onset

of chest pain starting yesterday, located midsternum, radiating to left jaw and 

left upper extremity, described as sharp and pressure, exacerbated relieved by 

nothing.  Says that this pain is exactly like his pain when he had a heart 

attack.  Reports associated shortness of breath.  He has been compliant with 

dual antiplatelet therapy.  No history of DVT/PE.  Denies any illicit drug use. 

No history of COPD/asthma.  Denies heart palpitations, swelling to lower 

extremities, lightheadedness.  Denies nausea, vomiting, diarrhea.  Has no 

urinary symptoms.  No recent illness or fevers.








ED Course: 





VITAL SIGNS u Oct 22, 2020 14:50 JAKOB Dey Kelsey 


BP: 113/71, Pulse: 68, Resp: 16, Temp: 98.6 (Oral), O2 sat: 99 on (Room Air), 

Time: 10/22/2020 14:50. 


VITAL SIGNS u Oct 22, 2020 16:08 JAKOB Mejia Nicole 


BP: 108/67, MAP: 81, Pulse: 66, Resp: 17, Pain: 9, O2 sat: 99 on (Room Air), 

Time: 10/22/2020 16:08. 


VITAL SIGNS u Oct 22, 2020 17:40 JAKOB Mejia Nicole 


BP: 119/76, MAP: 90, Pulse: 62, Resp: 18, Temp: 99.0 (Oral), Pain: 8, O2 sat: 

100 on (Room Air), Time: 10/22/2020 17:40. 


VITAL SIGNS u Oct 22, 2020 18:45 JAKOB Liang Sarah 


BP: 123/80, MAP: 94, Pulse: 67, Resp: 17, Pain: 8, O2 sat: 100 on (Room Air), 

Time: 10/22/2020 18:45. 


VITAL SIGNS u Oct 22, 2020 19:00 JAKOB Liang Sarah 


BP: 121/79, MAP: 93, Pulse: 73, Resp: 17, Temp: 98.8 (Oral), Pain: 8, O2 sat: 99

on (Room Air), Time: 10/22/2020 19:00. 


VITAL SIGNS u Oct 22, 2020 19:20 JAKOB Liang, Coleen 


BP: 113/77, MAP: 89, Pulse: 69, Resp: 18, O2 sat: 98 on (Room Air), Time: 

10/22/2020 19:20.





Medication administration:


nitroglycerin sublingual 0.4 mg Sublingual Given 18:55 10/22/2020 


Aspirin Low Dose 243 mg Oral Given 18:55 10/22/2020





Hospitalist ROS





- Review of Systems


All other systems reviewed; all pertinent +/- noted in HPI/Subj





- Medication


Medications: 


aspirin oral 


TABLET : Strength - 81 mg : ORAL


Patient Dose: 1 tab(s) Oral once a day. 





atorvastatin 


TABLET : Strength - 40 mg : ORAL


Patient Dose: 1 tab(s) Oral once a day (at bedtime). 





lisinopril 


TABLET : Strength - 20 mg : ORAL


Patient Dose: 5 mg Oral once a day. 





omeprazole 


CAPSULE,DELAYED RELEASE (ENTERIC COATED) : Strength - 20 mg : ORAL


Patient Dose: 1 tab(s) Oral 2 times a day (before meals). 





nitroglycerin sublingual 


tablet, sublingual : Strength - 0.4 mg : SUBLINGUAL


Patient Dose: once a day PRN. 





Brilinta 


tablet : Strength - 90 mg : ORAL


Patient Dose: Unknown. 





Allergies: NKDA





Hospitalist History





- Past Medical History


Source: patient, , RN notes reviewed


Cardiac: reports: CAD, HTN, MI, Hyperlipidemia


Gastrointestinal: reports: GERD





- Past Surgical History


Past Surgical History: reports: Other (CAD x1 (), back surgery ())





- Family History


Family History: reports: cardiac disorder (Father)





- Social History


Smoking Status: Current every day smoker (Half pack per day x10 years)


Alcohol: reports: None


Drugs: reports: none


Living Situation: With Family


Occupation: Does not work


Activity level: independent ambulation





- Exam


General Appearance: NAD, awake alert


Eye: anicteric sclera


ENT: normocephalic atraumatic


Neck: supple, symmetric, no JVD


Heart: RRR, no murmur, no gallops, no rubs, normal peripheral pulses


Respiratory: CTAB, no wheezes, no rales, no ronchi, normal chest expansion, no 

tachypnea


Gastrointestinal: soft, non-tender, normal bowel sounds, no bruit, no guarding, 

no rigidity


Extremities: no cyanosis, no edema


Skin: no rashes


Neurological: no weakness, no focal deficits


Musculoskeletal: normal tone, normal strength


Psychiatric: normal affect, A&O x 3 (Slovenian speaking)





Hospitalist Results





- Labs


Result Diagrams: 


                                 10/22/20 15:10





                                 10/22/20 15:10


Lab results: 


                                        











WBC  7.0 thou/uL (4.8-10.8)   10/22/20  15:10    


 


Hgb  14.7 g/dL (14.0-18.0)   10/22/20  15:10    


 


Hct  45.1 % (42.0-52.0)   10/22/20  15:10    


 


MCV  91.0 fL (78.0-98.0)   10/22/20  15:10    


 


Plt Count  197 thou/uL (130-400)   10/22/20  15:10    


 


Neutrophils %  48.7 % (42.0-75.0)   10/22/20  15:10    


 


Sodium  138 mmol/L (136-145)   10/22/20  15:10    


 


Potassium  4.5 mmol/L (3.5-5.1)   10/22/20  15:10    


 


Chloride  105 mmol/L ()   10/22/20  15:10    


 


Carbon Dioxide  25 mmol/L (22-29)   10/22/20  15:10    


 


BUN  14 mg/dL (8.9-20.6)   10/22/20  15:10    


 


Creatinine  1.22 mg/dL (0.7-1.3)   10/22/20  15:10    


 


Glucose  96 mg/dL ()   10/22/20  15:10    


 


Calcium  8.6 mg/dL (7.8-10.44)   10/22/20  15:10    


 


Total Bilirubin  0.5 mg/dL (0.2-1.2)   10/22/20  15:10    


 


AST  28 U/L (5-34)   10/22/20  15:10    


 


ALT  28 U/L (8-55)   10/22/20  15:10    


 


Alkaline Phosphatase  114 U/L ()  H  10/22/20  15:10    


 


Creatine Kinase  178 U/L ()   10/22/20  15:10    


 


Troponin I  Less than  0.010 ng/mL (< 0.028)   10/22/20  17:26    


 


Serum Total Protein  8.0 g/dL (6.0-8.3)   10/22/20  15:10    


 


Albumin  4.0 g/dL (3.5-5.0)   10/22/20  15:10    


 


Lipase  42 U/L (8-78)   10/22/20  15:10    














- EKG Interpretation


EK lead EKG interpreted by Emergency Department Physician at time of study, 12 

lead EKG shows normal sinus rhythm, Rate (beats per minute): 67, with no ec

topics, Interpretation: normal EKG, ST segments normal, T waves normal, Axis 

normal.





- Radiology Interpretation


  ** Chest x-ray


Status: report reviewed by me


Additional Comment: 





IMPRESSION: No evidence of acute cardiopulmonary disease 





Hospitalist H&P A/P





- Problem


(1) Chest pain


Code(s): R07.9 - CHEST PAIN, UNSPECIFIED   Status: Acute   





(2) CAD (coronary artery disease)


Code(s): I25.10 - ATHSCL HEART DISEASE OF NATIVE CORONARY ARTERY W/O ANG PCTRS  

Status: Chronic   





(3) Hypertension


Code(s): I10 - ESSENTIAL (PRIMARY) HYPERTENSION   Status: Chronic   





(4) Dyslipidemia


Code(s): E78.5 - HYPERLIPIDEMIA, UNSPECIFIED   Status: Acute   





(5) GERD (gastroesophageal reflux disease)


Code(s): K21.9 - GASTRO-ESOPHAGEAL REFLUX DISEASE WITHOUT ESOPHAGITIS   Status: 

Chronic   





(6) Tobacco abuse


Code(s): Z72.0 - TOBACCO USE   Status: Chronic   





- Plan


Plan: 





45/M with PMH CAD that presents for chest pain.


Admit telemetry floor, observation status.  Expected length of stay less than 2 

midnights.





Presented NL BP, HR, RR, SPO2, afebrile


EKG NSR, no ST elevation.


CXR no acute process.


Troponin negative x2.





#Chest pain


Heart score 3, Wells PE score 0.


Continue aspirin and Brilinta.


Add Nitropaste and morphine as needed.


Trend troponins, check TSH, mag, UA.


Had recent FLP.


Consult cardiology.


N.p.o.





#CAD


Recent cardiac stent () by Dr. Begum.


Reports compliant with Brilinta and aspirin therapy.


Continue Brilinta and aspirin.





#Hypertension


Presented normotensive.


Takes lisinopril at home.


Restart home dose of lisinopril.


Continue to monitor BP.





#Dyslipidemia


Recent FLP.


Takes atorvastatin at home.


Restart home dose of atorvastatin.





#GERD


Takes omeprazole at home.


Restart home dose of omeprazole.





#Tobacco abuse


Smokes half pack per day x10 years.


Tobacco cessation counseling.





SCDs for DVT prophylaxis.


No GI prophylaxis.


Full code.


Discussed case with Dr. Carlyle Pelayo.

## 2020-10-22 NOTE — RAD
EXAM: Single view of the chest



HISTORY:   Chest pain with positive Covid test one month ago



COMPARISON: 10/6/2020



FINDINGS: Single view of the chest shows a normal sized cardiomediastinal silhouette. There is no amanda
dence of consolidation, mass, or pleural effusion. No acute osseous abnormality.



IMPRESSION: No evidence of acute cardiopulmonary disease 



Reported By: Isaac Pryor 

Electronically Signed:  10/22/2020 3:05 PM

## 2020-10-23 ENCOUNTER — HOSPITAL ENCOUNTER (EMERGENCY)
Dept: HOSPITAL 92 - ERS | Age: 45
LOS: 1 days | Discharge: HOME | End: 2020-10-24
Payer: SELF-PAY

## 2020-10-23 VITALS — TEMPERATURE: 97.7 F | SYSTOLIC BLOOD PRESSURE: 121 MMHG | DIASTOLIC BLOOD PRESSURE: 79 MMHG

## 2020-10-23 DIAGNOSIS — Z79.899: ICD-10-CM

## 2020-10-23 DIAGNOSIS — E78.00: ICD-10-CM

## 2020-10-23 DIAGNOSIS — I10: ICD-10-CM

## 2020-10-23 DIAGNOSIS — I25.2: ICD-10-CM

## 2020-10-23 DIAGNOSIS — Z79.82: ICD-10-CM

## 2020-10-23 DIAGNOSIS — Z87.891: ICD-10-CM

## 2020-10-23 DIAGNOSIS — R07.89: Primary | ICD-10-CM

## 2020-10-23 LAB
ALBUMIN SERPL BCG-MCNC: 3.5 G/DL (ref 3.5–5)
ALP SERPL-CCNC: 95 U/L (ref 40–110)
ALT SERPL W P-5'-P-CCNC: 26 U/L (ref 8–55)
ANION GAP SERPL CALC-SCNC: 12 MMOL/L (ref 10–20)
ANION GAP SERPL CALC-SCNC: 12 MMOL/L (ref 10–20)
AST SERPL-CCNC: 26 U/L (ref 5–34)
BASOPHILS # BLD AUTO: 0.1 THOU/UL (ref 0–0.2)
BASOPHILS # BLD AUTO: 0.1 THOU/UL (ref 0–0.2)
BASOPHILS NFR BLD AUTO: 1.1 % (ref 0–1)
BASOPHILS NFR BLD AUTO: 1.3 % (ref 0–1)
BILIRUB SERPL-MCNC: 0.5 MG/DL (ref 0.2–1.2)
BUN SERPL-MCNC: 15 MG/DL (ref 8.9–20.6)
BUN SERPL-MCNC: 16 MG/DL (ref 8.9–20.6)
CALCIUM SERPL-MCNC: 8.2 MG/DL (ref 7.8–10.44)
CALCIUM SERPL-MCNC: 8.6 MG/DL (ref 7.8–10.44)
CHD RISK SERPL-RTO: 3.8 (ref ?–4.5)
CHLORIDE SERPL-SCNC: 105 MMOL/L (ref 98–107)
CHLORIDE SERPL-SCNC: 105 MMOL/L (ref 98–107)
CHOLEST SERPL-MCNC: 102 MG/DL
CO2 SERPL-SCNC: 24 MMOL/L (ref 22–29)
CO2 SERPL-SCNC: 25 MMOL/L (ref 22–29)
CREAT CL PREDICTED SERPL C-G-VRATE: 0 ML/MIN (ref 70–130)
CREAT CL PREDICTED SERPL C-G-VRATE: 105 ML/MIN (ref 70–130)
EOSINOPHIL # BLD AUTO: 0.5 THOU/UL (ref 0–0.7)
EOSINOPHIL # BLD AUTO: 0.5 THOU/UL (ref 0–0.7)
EOSINOPHIL NFR BLD AUTO: 10.4 % (ref 0–10)
EOSINOPHIL NFR BLD AUTO: 7.2 % (ref 0–10)
GLOBULIN SER CALC-MCNC: 3 G/DL (ref 2.4–3.5)
GLUCOSE SERPL-MCNC: 113 MG/DL (ref 70–105)
GLUCOSE SERPL-MCNC: 92 MG/DL (ref 70–105)
HDLC SERPL-MCNC: 27 MG/DL
HGB BLD-MCNC: 14 G/DL (ref 14–18)
HGB BLD-MCNC: 14.2 G/DL (ref 14–18)
LDLC SERPL CALC-MCNC: 25 MG/DL
LIPASE SERPL-CCNC: 37 U/L (ref 8–78)
LYMPHOCYTES # BLD: 1.8 THOU/UL (ref 1.2–3.4)
LYMPHOCYTES # BLD: 2.2 THOU/UL (ref 1.2–3.4)
LYMPHOCYTES NFR BLD AUTO: 34.1 % (ref 21–51)
LYMPHOCYTES NFR BLD AUTO: 36.9 % (ref 21–51)
MCH RBC QN AUTO: 30.5 PG (ref 27–31)
MCH RBC QN AUTO: 30.7 PG (ref 27–31)
MCV RBC AUTO: 91.7 FL (ref 78–98)
MCV RBC AUTO: 92.8 FL (ref 78–98)
MONOCYTES # BLD AUTO: 0.5 THOU/UL (ref 0.11–0.59)
MONOCYTES # BLD AUTO: 0.6 THOU/UL (ref 0.11–0.59)
MONOCYTES NFR BLD AUTO: 11.3 % (ref 0–10)
MONOCYTES NFR BLD AUTO: 8.4 % (ref 0–10)
NEUTROPHILS # BLD AUTO: 2 THOU/UL (ref 1.4–6.5)
NEUTROPHILS # BLD AUTO: 3.2 THOU/UL (ref 1.4–6.5)
NEUTROPHILS NFR BLD AUTO: 40.2 % (ref 42–75)
NEUTROPHILS NFR BLD AUTO: 49.1 % (ref 42–75)
PLATELET # BLD AUTO: 165 THOU/UL (ref 130–400)
PLATELET # BLD AUTO: 176 THOU/UL (ref 130–400)
POTASSIUM SERPL-SCNC: 4 MMOL/L (ref 3.5–5.1)
POTASSIUM SERPL-SCNC: 4.2 MMOL/L (ref 3.5–5.1)
PROT UR STRIP.AUTO-MCNC: 10 MG/DL
RBC # BLD AUTO: 4.58 MILL/UL (ref 4.7–6.1)
RBC # BLD AUTO: 4.63 MILL/UL (ref 4.7–6.1)
RBC UR QL AUTO: (no result) HPF (ref 0–3)
SODIUM SERPL-SCNC: 137 MMOL/L (ref 136–145)
SODIUM SERPL-SCNC: 138 MMOL/L (ref 136–145)
SP GR UR STRIP: 1.02 (ref 1–1.04)
TRIGL SERPL-MCNC: 252 MG/DL (ref ?–150)
WBC # BLD AUTO: 4.9 THOU/UL (ref 4.8–10.8)
WBC # BLD AUTO: 6.4 THOU/UL (ref 4.8–10.8)
WBC UR QL AUTO: (no result) HPF (ref 0–3)

## 2020-10-23 PROCEDURE — 71045 X-RAY EXAM CHEST 1 VIEW: CPT

## 2020-10-23 PROCEDURE — 96374 THER/PROPH/DIAG INJ IV PUSH: CPT

## 2020-10-23 PROCEDURE — 36415 COLL VENOUS BLD VENIPUNCTURE: CPT

## 2020-10-23 PROCEDURE — 93005 ELECTROCARDIOGRAM TRACING: CPT

## 2020-10-23 PROCEDURE — 83690 ASSAY OF LIPASE: CPT

## 2020-10-23 PROCEDURE — 84484 ASSAY OF TROPONIN QUANT: CPT

## 2020-10-23 RX ADMIN — NITROGLYCERIN SCH: 20 OINTMENT TOPICAL at 06:59

## 2020-10-23 NOTE — DIS
DATE OF ADMISSION:  10/22/2020



DATE OF DISCHARGE:  10/23/2020



DISCHARGE DISPOSITION:  Home.



FOLLOWUP:  

1. Follow up with primary care physician Dr. Bahena in 1 week.

2. Follow up with Cardiology, Dr. Begum as scheduled.



ALLERGIES:  NO KNOWN DRUG ALLERGIES.



DISCHARGE MEDICATION:  Brilinta was changed to Plavix.  All other home medications

were left unchanged.  He was given a prescription for Lodine by Dr. Begum.  All

other home medications were left unchanged. 



The patient was seen and examined on the day of discharge.  Denies any new

complaints.  No chest pain, shortness of breath, palpitations reported. 



BRIEF HOSPITAL COURSE:  The patient is a 45-year-old male with recent stent

placement, presented to the emergency room with chest discomfort.  Please refer to

the history and physical for further details. 



The patient was admitted to the hospital with a diagnosis of chest discomfort, rule

out acute coronary syndrome.  Serial troponins remained negative.  The patient was

evaluated by Cardiology, Dr. Begum.  Dr. Begum recommended a stress test which was

negative for reversible ischemia.  Dr. Begum recommended a short course of

antiinflammatory.  Also Brilinta was transitioned to Plavix per Cardiology

recommendation.  He has been cleared by Cardiology for discharge. 



FINAL DIAGNOSES:  

1. Chest discomfort, acute coronary syndrome ruled out.

2. Coronary artery disease status post recent MI with stent placement.

3. Hyperlipidemia.

4. Gastroesophageal reflux disease.

5. Tobacco dependence.



SIGNIFICANT LABORATORY DATA:  Fasting lipid profile showed triglyceride 252,

cholesterol 102, LDL 25 with HDL of 27.  TSH was 2.2.  Troponin was negative. 



The patient understands the above plan of care.







Job ID:  [id number]

## 2020-10-23 NOTE — NM
Radionucleotide stress and rest myocardial perfusion scan with CT attenuation correction and SPECT im
aging

Left ventricular wall motion evaluation and ejection fraction



HISTORY: Chest pain.



FINDINGS: Lexiscan protocol. Heterogeneous uptake of radiotracer throughout the left ventricular myoc
ardium. No focal perfusion defect or reversibility.



QGS analysis of gated SPECT images shows no focal wall motion abnormalities. Ejection fraction calcul
ated at 49%.



  



IMPRESSION :

No evidence of ischemia.



Borderline LVEF 49%.



Reported By: JOSE A Giron 

Electronically Signed:  10/23/2020 1:27 PM

## 2020-10-23 NOTE — PRG
DATE OF SERVICE:  10/23/2020



SUBJECTIVE:  Mr. Neff came back to the hospital last night again complaining of

chest pain. 



He said __________ left to the midline, goes up underneath the collarbone of the

left shoulder and left side of his neck.  The patient stated it did hurt somewhat

more with a deep breath.  He said it is in the same location as when he had a heart

attack. 



His heart attack was 6 weeks ago.  He had a stent placed in the circumflex

distribution. 



The patient since then has had COVID infection, but has recovered from that. 



The patient did not have any change in his pain with nitroglycerin last night.  The

cardiac enzymes were completely normal and the EKGs were completely normal. 



Stress test revealed no evidence of any ischemia.  The patient's pain resolved with

Toradol 1 time dose. 



PHYSICAL EXAMINATION:

VITAL SIGNS:  Blood pressure is 120/79, pulse 65 and regular. 

LUNGS:  Clear. 

CARDIAC:  Normal S1, normal S2. 

ABDOMEN:  Soft and nontender. 

EXTREMITIES:  There is no edema.  Testing as outlined above.



ASSESSMENT:  

1. Status post myocardial infarction 6 weeks ago, had a stent placed in the

circumflex distribution, had a borderline lesion in the LAD distribution.  The

lesion was just after extremely large septal perforating artery which supplied most

of his septum.  The LAD past that area just supplies large diagonal branch area.

The apex was supplied by the right coronary artery.  Intervention in that area could

jeopardize his very large septal perforating artery __________ that could

potentially be occluded with stenting. 

2. Atypical chest pain.

3. Negative cardiac enzymes.

4. Currently, the pain more typical of musculoskeletal.



PLAN:  

1. He got Toradol one time.

2. Give Lodine XL for 7 days.

3. Continue activity.

4. He quit smoking 6 weeks ago after his myocardial infarction.



MEDICATIONS:  

1. Aspirin 81 mg a day.

2. Plavix 75 mg a day.

3. Lisinopril 5 mg a day.

4. Lipitor 40 mg a day.

5. Lodine  mg a day for 7 days.







Job ID:  [id number]

## 2020-10-24 NOTE — RAD
PORTABLE CHEST:

 

Date:  10/23/2020

 

COMPARISON:  

Prior day's study. 

 

HISTORY:  

Chest pain. 

 

FINDINGS:

Heart size and mediastinum are within normal limits. The lungs appear clear of any infiltrates. No si
gnificant bony findings. 

 

IMPRESSION: 

No active intrathoracic disease. 

 

 

POS: OFF